# Patient Record
Sex: MALE | Race: WHITE | NOT HISPANIC OR LATINO | Employment: FULL TIME | URBAN - METROPOLITAN AREA
[De-identification: names, ages, dates, MRNs, and addresses within clinical notes are randomized per-mention and may not be internally consistent; named-entity substitution may affect disease eponyms.]

---

## 2023-11-24 ENCOUNTER — OFFICE VISIT (OUTPATIENT)
Dept: URGENT CARE | Facility: CLINIC | Age: 53
End: 2023-11-24
Payer: COMMERCIAL

## 2023-11-24 VITALS
OXYGEN SATURATION: 97 % | DIASTOLIC BLOOD PRESSURE: 94 MMHG | SYSTOLIC BLOOD PRESSURE: 163 MMHG | TEMPERATURE: 98 F | RESPIRATION RATE: 18 BRPM | HEART RATE: 94 BPM

## 2023-11-24 DIAGNOSIS — M79.89 SWELLING OF LEFT FOOT: ICD-10-CM

## 2023-11-24 DIAGNOSIS — M79.672 LEFT FOOT PAIN: Primary | ICD-10-CM

## 2023-11-24 DIAGNOSIS — M10.9 GOUT, UNSPECIFIED CAUSE, UNSPECIFIED CHRONICITY, UNSPECIFIED SITE: ICD-10-CM

## 2023-11-24 PROCEDURE — 73630 X-RAY EXAM OF FOOT: CPT | Mod: LT,S$GLB,, | Performed by: RADIOLOGY

## 2023-11-24 PROCEDURE — 73630 XR FOOT COMPLETE 3 VIEW LEFT: ICD-10-PCS | Mod: LT,S$GLB,, | Performed by: RADIOLOGY

## 2023-11-24 PROCEDURE — 99204 PR OFFICE/OUTPT VISIT, NEW, LEVL IV, 45-59 MIN: ICD-10-PCS | Mod: S$GLB,,, | Performed by: NURSE PRACTITIONER

## 2023-11-24 PROCEDURE — 99204 OFFICE O/P NEW MOD 45 MIN: CPT | Mod: S$GLB,,, | Performed by: NURSE PRACTITIONER

## 2023-11-24 RX ORDER — COLCHICINE 0.6 MG/1
TABLET ORAL
Qty: 3 TABLET | Refills: 0 | Status: SHIPPED | OUTPATIENT
Start: 2023-11-24 | End: 2023-11-26

## 2023-11-24 RX ORDER — INDOMETHACIN 50 MG/1
50 CAPSULE ORAL 3 TIMES DAILY
Qty: 30 CAPSULE | Refills: 0 | Status: SHIPPED | OUTPATIENT
Start: 2023-11-24 | End: 2023-12-04

## 2023-11-24 NOTE — PATIENT INSTRUCTIONS
Rest  Hydration/increase fluids  Elevate left foot  Complete Colchicine course as directed  Indomethacin 3 times daily as needed for pain  HOLD Ibuprofen with Indomethacin use  Establish with Primary Care Provider

## 2023-11-24 NOTE — PROGRESS NOTES
Subjective:      Patient ID: Obie Cast is a 53 y.o. male.    Vitals:  oral temperature is 97.9 °F (36.6 °C). His blood pressure is 163/94 (abnormal) and his pulse is 94. His respiration is 18 and oxygen saturation is 97%.     Chief Complaint: Toe Pain    53 year old male presents for evaluation of suspected gout flare to left foot x 3 days. Reports rolling ankle Sunday with minimal soreness post incident. Reports waking up Tuesday with left foot pain that felt like gout, and was unable to bear weight to left foot. Symptoms have progressed and worsened since onset. Woke up this morning with pain and swelling to left toes x 5. C/O left foot dorsal and left toe x 5  pain, stiffness, and swelling. OTC Ibuprofen 800 mg with mild relief, last dose 2 am. H/O Gout previously managed with Allopurinol 300 daily, off medication over 1 year. Relocated to [place] from [place] 2 years ago, has not established care.     Toe Pain   The incident occurred 3 to 5 days ago. The incident occurred at home. There was no injury mechanism. The pain is present in the left toes and left foot. The quality of the pain is described as aching and shooting. The pain is at a severity of 8/10. The pain is severe. The pain has been Constant since onset. Associated symptoms include an inability to bear weight and a loss of motion. He reports no foreign bodies present. The symptoms are aggravated by movement and weight bearing. He has tried NSAIDs for the symptoms. The treatment provided mild relief.       Constitution: Negative.   HENT: Negative.     Neck: neck negative.   Cardiovascular: Negative.    Eyes: Negative.    Respiratory: Negative.     Gastrointestinal: Negative.    Endocrine: negative.   Genitourinary: Negative.    Musculoskeletal:  Positive for pain (left foot dorsal, left foot toes x 5), joint pain (left foot dorsal, left foot toes x 5), joint swelling (left foot dorsal, left foot toes x 5) and gout.   Skin:  Positive for erythema  (left foot).   Allergic/Immunologic: Negative.    Neurological: Negative.    Hematologic/Lymphatic: Negative.    Psychiatric/Behavioral: Negative.        Objective:     Physical Exam   Constitutional: He is oriented to person, place, and time. He is cooperative.  Non-toxic appearance. He does not appear ill. No distress. normalawake  HENT:   Head: Normocephalic and atraumatic.   Ears:   Right Ear: Hearing normal.   Left Ear: Hearing normal.   Eyes: Conjunctivae and lids are normal. Pupils are equal, round, and reactive to light. Right eye exhibits no discharge. Left eye exhibits no discharge. No scleral icterus. Extraocular movement intact   Neck: Neck supple.   Cardiovascular: Normal rate.   Pulmonary/Chest: Effort normal.   Abdominal: Normal appearance.   Musculoskeletal:         General: Swelling (left foot dorsal) and tenderness (left foot dorsal and left toes x 5) present. No deformity.      Right lower leg: No edema.      Left lower leg: Edema present.      Left foot: Decreased range of motion. Normal capillary refill. Tenderness, bony tenderness and swelling present. Left great toe: Exhibits swelling and tenderness. Left 2nd toe: Exhibits swelling and tenderness. Left 3rd toe: Exhibits swelling and tenderness. Left 4th toe: Exhibits swelling and tenderness. Left little toe: Exhibits swelling and tenderness.   Neurological: no focal deficit. He is alert and oriented to person, place, and time.   Skin: Skin is warm, dry, not diaphoretic, not pale and no rash. erythema (left foot) No bruising and No lesion jaundice  Psychiatric: His behavior is normal. Mood, judgment and thought content normal.   X-Ray Foot Complete 3 view Left    Result Date: 11/24/2023  EXAMINATION: XR FOOT COMPLETE 3 VIEW LEFT CLINICAL HISTORY: .  Pain in left foot TECHNIQUE: AP, lateral and oblique views of the left foot were performed. COMPARISON: None FINDINGS: No acute fracture.  Mild 1st MTP joint degenerative findings.  Small calcaneal  enthesophytes noted.  Dorsal foot soft tissue edema possible.     As above Electronically signed by: Brad Argueta MD Date:    11/24/2023 Time:    10:29       Assessment:     1. Left foot pain    2. Gout, unspecified cause, unspecified chronicity, unspecified site    3. Swelling of left foot        Plan:       Patient presents with left foot pain, recent twisting injury and suspected gout exacerbation. Decision to perform foot x-ray to rule out trauma, findings discussed. Decision to perform uric acid level to evaluate for gout. Plan is to treat acute gout attack, manage symptoms, and prevent worsening. Discussed with patient who verbalizes understanding.     Left foot pain  -     X-Ray Foot Complete 3 view Left; Future; Expected date: 11/24/2023  -     indomethacin (INDOCIN) 50 MG capsule; Take 1 capsule (50 mg total) by mouth 3 (three) times daily. for 10 days  Dispense: 30 capsule; Refill: 0    Gout, unspecified cause, unspecified chronicity, unspecified site  -     Cancel: URIC ACID; Future; Expected date: 11/24/2023  -     colchicine (COLCRYS) 0.6 mg tablet; Take 2 tablets (1.2 mg total) by mouth once daily for 1 day, THEN 1 tablet (0.6 mg total) once daily for 1 day.  Dispense: 3 tablet; Refill: 0  -     URIC ACID    Swelling of left foot                Patient Instructions   Rest  Hydration/increase fluids  Elevate left foot  Complete Colchicine course as directed  Indomethacin 3 times daily as needed for pain  HOLD Ibuprofen with Indomethacin use  Establish with Primary Care Provider